# Patient Record
Sex: FEMALE | Race: WHITE | NOT HISPANIC OR LATINO
[De-identification: names, ages, dates, MRNs, and addresses within clinical notes are randomized per-mention and may not be internally consistent; named-entity substitution may affect disease eponyms.]

---

## 2017-04-11 ENCOUNTER — APPOINTMENT (OUTPATIENT)
Dept: NEUROLOGY | Facility: CLINIC | Age: 27
End: 2017-04-11

## 2018-10-13 ENCOUNTER — TRANSCRIPTION ENCOUNTER (OUTPATIENT)
Age: 28
End: 2018-10-13

## 2019-08-28 ENCOUNTER — APPOINTMENT (OUTPATIENT)
Dept: ANTEPARTUM | Facility: CLINIC | Age: 29
End: 2019-08-28

## 2019-08-28 ENCOUNTER — ASOB RESULT (OUTPATIENT)
Age: 29
End: 2019-08-28

## 2019-08-28 ENCOUNTER — APPOINTMENT (OUTPATIENT)
Dept: ANTEPARTUM | Facility: CLINIC | Age: 29
End: 2019-08-28
Payer: COMMERCIAL

## 2019-08-28 PROCEDURE — 76813 OB US NUCHAL MEAS 1 GEST: CPT

## 2019-08-28 PROCEDURE — 76801 OB US < 14 WKS SINGLE FETUS: CPT

## 2019-08-28 PROCEDURE — 36416 COLLJ CAPILLARY BLOOD SPEC: CPT

## 2019-08-31 ENCOUNTER — APPOINTMENT (OUTPATIENT)
Dept: ANTEPARTUM | Facility: CLINIC | Age: 29
End: 2019-08-31

## 2019-10-22 ENCOUNTER — ASOB RESULT (OUTPATIENT)
Age: 29
End: 2019-10-22

## 2019-10-22 ENCOUNTER — APPOINTMENT (OUTPATIENT)
Dept: ANTEPARTUM | Facility: CLINIC | Age: 29
End: 2019-10-22
Payer: COMMERCIAL

## 2019-10-22 PROCEDURE — 76811 OB US DETAILED SNGL FETUS: CPT

## 2019-11-12 ENCOUNTER — OUTPATIENT (OUTPATIENT)
Dept: OUTPATIENT SERVICES | Age: 29
LOS: 1 days | Discharge: HOME | End: 2019-11-12

## 2019-11-13 ENCOUNTER — APPOINTMENT (OUTPATIENT)
Dept: PEDIATRIC CARDIOLOGY | Facility: CLINIC | Age: 29
End: 2019-11-13
Payer: COMMERCIAL

## 2019-11-13 PROCEDURE — 99201 OFFICE OUTPATIENT NEW 10 MINUTES: CPT | Mod: 25

## 2019-11-13 PROCEDURE — 76820 UMBILICAL ARTERY ECHO: CPT

## 2019-11-13 PROCEDURE — 76827 ECHO EXAM OF FETAL HEART: CPT

## 2019-11-13 PROCEDURE — 76821 MIDDLE CEREBRAL ARTERY ECHO: CPT

## 2019-11-13 PROCEDURE — 76825 ECHO EXAM OF FETAL HEART: CPT

## 2019-11-13 PROCEDURE — 93325 DOPPLER ECHO COLOR FLOW MAPG: CPT | Mod: 59

## 2020-01-29 ENCOUNTER — OUTPATIENT (OUTPATIENT)
Dept: OUTPATIENT SERVICES | Facility: HOSPITAL | Age: 30
LOS: 1 days | Discharge: HOME | End: 2020-01-29
Payer: COMMERCIAL

## 2020-01-29 VITALS
DIASTOLIC BLOOD PRESSURE: 56 MMHG | HEART RATE: 65 BPM | SYSTOLIC BLOOD PRESSURE: 104 MMHG | TEMPERATURE: 98 F | RESPIRATION RATE: 20 BRPM

## 2020-01-29 VITALS — TEMPERATURE: 98 F

## 2020-01-29 DIAGNOSIS — O26.899 OTHER SPECIFIED PREGNANCY RELATED CONDITIONS, UNSPECIFIED TRIMESTER: ICD-10-CM

## 2020-01-29 DIAGNOSIS — Z3A.00 WEEKS OF GESTATION OF PREGNANCY NOT SPECIFIED: ICD-10-CM

## 2020-01-29 DIAGNOSIS — Z98.82 BREAST IMPLANT STATUS: Chronic | ICD-10-CM

## 2020-01-29 LAB
ALBUMIN SERPL ELPH-MCNC: 3.3 G/DL — SIGNIFICANT CHANGE UP (ref 3.3–5)
ALP SERPL-CCNC: 93 U/L — SIGNIFICANT CHANGE UP (ref 40–120)
ALT FLD-CCNC: 15 U/L — SIGNIFICANT CHANGE UP (ref 4–33)
AMYLASE P1 CFR SERPL: 80 U/L — SIGNIFICANT CHANGE UP (ref 25–125)
ANION GAP SERPL CALC-SCNC: 11 MMO/L — SIGNIFICANT CHANGE UP (ref 7–14)
APPEARANCE UR: CLEAR — SIGNIFICANT CHANGE UP
AST SERPL-CCNC: 21 U/L — SIGNIFICANT CHANGE UP (ref 4–32)
BASOPHILS # BLD AUTO: 0.05 K/UL — SIGNIFICANT CHANGE UP (ref 0–0.2)
BASOPHILS NFR BLD AUTO: 0.4 % — SIGNIFICANT CHANGE UP (ref 0–2)
BILIRUB SERPL-MCNC: 0.4 MG/DL — SIGNIFICANT CHANGE UP (ref 0.2–1.2)
BILIRUB UR-MCNC: NEGATIVE — SIGNIFICANT CHANGE UP
BLOOD UR QL VISUAL: NEGATIVE — SIGNIFICANT CHANGE UP
BUN SERPL-MCNC: 8 MG/DL — SIGNIFICANT CHANGE UP (ref 7–23)
CALCIUM SERPL-MCNC: 9 MG/DL — SIGNIFICANT CHANGE UP (ref 8.4–10.5)
CHLORIDE SERPL-SCNC: 103 MMOL/L — SIGNIFICANT CHANGE UP (ref 98–107)
CO2 SERPL-SCNC: 23 MMOL/L — SIGNIFICANT CHANGE UP (ref 22–31)
COLOR SPEC: YELLOW — SIGNIFICANT CHANGE UP
CREAT SERPL-MCNC: 0.5 MG/DL — SIGNIFICANT CHANGE UP (ref 0.5–1.3)
EOSINOPHIL # BLD AUTO: 0.09 K/UL — SIGNIFICANT CHANGE UP (ref 0–0.5)
EOSINOPHIL NFR BLD AUTO: 0.8 % — SIGNIFICANT CHANGE UP (ref 0–6)
GLUCOSE SERPL-MCNC: 64 MG/DL — LOW (ref 70–99)
GLUCOSE UR-MCNC: NEGATIVE — SIGNIFICANT CHANGE UP
HCT VFR BLD CALC: 37.2 % — SIGNIFICANT CHANGE UP (ref 34.5–45)
HGB BLD-MCNC: 12.5 G/DL — SIGNIFICANT CHANGE UP (ref 11.5–15.5)
IMM GRANULOCYTES NFR BLD AUTO: 0.5 % — SIGNIFICANT CHANGE UP (ref 0–1.5)
KETONES UR-MCNC: NEGATIVE — SIGNIFICANT CHANGE UP
LEUKOCYTE ESTERASE UR-ACNC: NEGATIVE — SIGNIFICANT CHANGE UP
LIDOCAIN IGE QN: 24.6 U/L — SIGNIFICANT CHANGE UP (ref 7–60)
LYMPHOCYTES # BLD AUTO: 2.68 K/UL — SIGNIFICANT CHANGE UP (ref 1–3.3)
LYMPHOCYTES # BLD AUTO: 22.6 % — SIGNIFICANT CHANGE UP (ref 13–44)
MCHC RBC-ENTMCNC: 30.6 PG — SIGNIFICANT CHANGE UP (ref 27–34)
MCHC RBC-ENTMCNC: 33.6 % — SIGNIFICANT CHANGE UP (ref 32–36)
MCV RBC AUTO: 91.2 FL — SIGNIFICANT CHANGE UP (ref 80–100)
MONOCYTES # BLD AUTO: 0.9 K/UL — SIGNIFICANT CHANGE UP (ref 0–0.9)
MONOCYTES NFR BLD AUTO: 7.6 % — SIGNIFICANT CHANGE UP (ref 2–14)
NEUTROPHILS # BLD AUTO: 8.07 K/UL — HIGH (ref 1.8–7.4)
NEUTROPHILS NFR BLD AUTO: 68.1 % — SIGNIFICANT CHANGE UP (ref 43–77)
NITRITE UR-MCNC: NEGATIVE — SIGNIFICANT CHANGE UP
NRBC # FLD: 0 K/UL — SIGNIFICANT CHANGE UP (ref 0–0)
PH UR: 7 — SIGNIFICANT CHANGE UP (ref 5–8)
PLATELET # BLD AUTO: 184 K/UL — SIGNIFICANT CHANGE UP (ref 150–400)
PMV BLD: 10.5 FL — SIGNIFICANT CHANGE UP (ref 7–13)
POTASSIUM SERPL-MCNC: 4.2 MMOL/L — SIGNIFICANT CHANGE UP (ref 3.5–5.3)
POTASSIUM SERPL-SCNC: 4.2 MMOL/L — SIGNIFICANT CHANGE UP (ref 3.5–5.3)
PROT SERPL-MCNC: 6.1 G/DL — SIGNIFICANT CHANGE UP (ref 6–8.3)
PROT UR-MCNC: 20 — SIGNIFICANT CHANGE UP
RBC # BLD: 4.08 M/UL — SIGNIFICANT CHANGE UP (ref 3.8–5.2)
RBC # FLD: 13.1 % — SIGNIFICANT CHANGE UP (ref 10.3–14.5)
SODIUM SERPL-SCNC: 137 MMOL/L — SIGNIFICANT CHANGE UP (ref 135–145)
SP GR SPEC: 1.02 — SIGNIFICANT CHANGE UP (ref 1–1.04)
UROBILINOGEN FLD QL: NORMAL — SIGNIFICANT CHANGE UP
WBC # BLD: 11.85 K/UL — HIGH (ref 3.8–10.5)
WBC # FLD AUTO: 11.85 K/UL — HIGH (ref 3.8–10.5)

## 2020-01-29 PROCEDURE — 99202 OFFICE O/P NEW SF 15 MIN: CPT

## 2020-01-29 PROCEDURE — 59025 FETAL NON-STRESS TEST: CPT | Mod: 26

## 2020-01-29 PROCEDURE — 76818 FETAL BIOPHYS PROFILE W/NST: CPT | Mod: 26

## 2020-01-29 NOTE — OB PROVIDER TRIAGE NOTE - NSHPLABSRESULTS_GEN_ALL_CORE
CBC  CMP  amylase/ lipase  urinalysis CBC  CMP  amylase/ lipase  urinalysis                          12.5   11.85 )-----------( 184      ( 2020 17:45 )             37.2   20 @ 17:45    137  |  103  |  8             --------------------------< 64<L>     4.2  |  23  | 0.50    eGFR AA: 149  eGFR N-AA: 129    Calcium: 9.0  Phosphorus: --  Magnesium: --    AST: 21    ALT: 15  AlkPhos: 93  Protein: 6.1  Albumin: 3.3  TBili: 0.4  D-Bili: --    Urinalysis Basic - ( 2020 17:45 )    Color: YELLOW / Appearance: CLEAR / S.023 / pH: 7.0  Gluc: NEGATIVE / Ketone: NEGATIVE  / Bili: NEGATIVE / Urobili: NORMAL   Blood: NEGATIVE / Protein: 20 / Nitrite: NEGATIVE   Leuk Esterase: NEGATIVE / RBC: x / WBC x   Sq Epi: x / Non Sq Epi: x / Bacteria: x

## 2020-01-29 NOTE — OB PROVIDER TRIAGE NOTE - NSOBPROVIDERNOTE_OBGYN_ALL_OB_FT
30 y/o  @ 34.4 wks gest presents with c/o 2 episodes of diarrhea one on  x's 1 hour and again @ 0200 this am , denies any blood in stool denies eating out at a restaurant denies any n/v denies any recent sick exposure denies any fever or chills denies any lof or VB reports +FM pt c/o intermittent abdominal cramping pt tolerating po solids and fluids ap care comp by myomas x's 2 / IVF pregnancy - unexplained infertility otherwise no other ap care comp at this time      abdomen: soft, nt on palp  SVE: closed /50/-3  T(C): 36.7 (20 @ 14:06), Max: 36.7 (20 @ 14:06)  HR: 63 (20 @ 17:29) (60 - 65)  BP: 97/55 (20 @ 17:29) (97/55 - 113/81)  RR: 20 (20 @ 14:06) (20 - 20)  SpO2: --  cat 1 FHT  toco: x's 2 uterine ctxns     denies any c/o diarrhea or abdominal cramping at this time    po hydration   po solids challenge    TAS: BPP: 8 vtx anterior placenta GERA: 10.38     allergies:  PCN- anaphylaxis  med hx: denies  surg hx:   age 20 breast augmentation   gyn hx:   IVF- unexplained infertility  myomas x's 2   ob hx: denies  medS:   PNV    awaiting labs  will continue to monitor 28 y/o  @ 34.4 wks gest presents with c/o 2 episodes of diarrhea one on  x's 1 hour and again @ 0200 this am , denies any blood in stool denies eating out at a restaurant denies any n/v denies any recent sick exposure denies any fever or chills denies any lof or VB reports +FM pt c/o intermittent abdominal cramping pt tolerating po solids and fluids ap care comp by myomas x's 2 / IVF pregnancy - unexplained infertility otherwise no other ap care comp at this time      abdomen: soft, nt on palp  SVE: closed /50/-3  T(C): 36.7 (20 @ 14:06), Max: 36.7 (20 @ 14:06)  HR: 63 (20 @ 17:29) (60 - 65)  BP: 97/55 (20 @ 17:29) (97/55 - 113/81)  RR: 20 (20 @ 14:06) (20 - 20)  SpO2: --  cat 1 FHT  toco: x's 2 uterine ctxns     denies any c/o diarrhea or abdominal cramping at this time    po hydration   po solids challenge    TAS: BPP: 8/8 vtx anterior placenta GERA: 10.38     allergies:  PCN- anaphylaxis  med hx: denies  surg hx:   age 20 breast augmentation   gyn hx:   IVF- unexplained infertility  myomas x's 2   ob hx: denies  medS:   PNV    awaiting labs  will continue to monitor      19:15- Received care of patient  Awaiting labs  Patient able to tolerate crackers and oral hydration at this time

## 2020-01-29 NOTE — OB PROVIDER TRIAGE NOTE - ADDITIONAL INSTRUCTIONS
Follow up at next scheduled prenatal appointment  Return for decreased fetal movement, loss of fluid or vaginal bleeding  Increase oral hydration  Increase diet as tolerated

## 2020-01-29 NOTE — OB RN TRIAGE NOTE - CHIEF COMPLAINT QUOTE
diarrhea started at 4am on sunday morning, nothing  on monday, 2 bouts of diarrhea just with abdominal cramping now diarrhea started at 4am on sunday morning, nothing  on monday, 2 bouts of diarrhea @ 0200 , abdominal cramping now

## 2020-02-04 ENCOUNTER — LABORATORY RESULT (OUTPATIENT)
Age: 30
End: 2020-02-04

## 2020-02-04 ENCOUNTER — APPOINTMENT (OUTPATIENT)
Dept: ENDOCRINOLOGY | Facility: CLINIC | Age: 30
End: 2020-02-04
Payer: COMMERCIAL

## 2020-02-04 VITALS
BODY MASS INDEX: 28.35 KG/M2 | SYSTOLIC BLOOD PRESSURE: 118 MMHG | DIASTOLIC BLOOD PRESSURE: 68 MMHG | HEART RATE: 76 BPM | WEIGHT: 160 LBS | OXYGEN SATURATION: 98 % | HEIGHT: 63 IN

## 2020-02-04 DIAGNOSIS — Z83.49 FAMILY HISTORY OF OTHER ENDOCRINE, NUTRITIONAL AND METABOLIC DISEASES: ICD-10-CM

## 2020-02-04 DIAGNOSIS — Z78.9 OTHER SPECIFIED HEALTH STATUS: ICD-10-CM

## 2020-02-04 DIAGNOSIS — N97.9 FEMALE INFERTILITY, UNSPECIFIED: ICD-10-CM

## 2020-02-04 PROCEDURE — 99205 OFFICE O/P NEW HI 60 MIN: CPT

## 2020-02-04 RX ORDER — ELASTIC BANDAGE 2"X2.2YD
BANDAGE TOPICAL
Refills: 0 | Status: ACTIVE | COMMUNITY

## 2020-02-04 NOTE — ASSESSMENT
[Levothyroxine] : The patient was instructed to take Levothyroxine on an empty stomach, separate from vitamins, and wait at least 30 minutes before eating [FreeTextEntry1] : 29 year old woman with hypothyroidism in setting of infertility, currently at 35 weeks gestation\par \par 1. Hypothyroidism in setting of infertility: Currently on Levothyroxine 50 mcg daily. Appears clinically euthyroid today. Will check TFTs today (TSH, free T4, total T3 and T3 uptake) to assess if she is biochemically euthyroid and will adjust dose as needed. Goal TSH in 3rd trimester of pregnancy is < 3. Will also check TPO titer as well. She was started on LT4 for TSH optimization during pregnancy given history of infertility requiring IVF. Infertility work up reportedly negative in the past. We discussed that she will mostly likely not require LT4 therapy after pregnancy given her TSH levels in the first trimester. Will have her follow up in 8 weeks (approximately 1 month after delivery) and will repeat TFTs then prior to discontinuation of Levothyroxine. She is agreeable to plan.\par \par Return visit in 8 weeks

## 2020-02-04 NOTE — PHYSICAL EXAM
[Alert] : alert [Well Nourished] : well nourished [No Acute Distress] : no acute distress [Well Developed] : well developed [Normal Sclera/Conjunctiva] : normal sclera/conjunctiva [Supple] : the neck was supple [No Neck Mass] : no neck mass was observed [No Proptosis] : no proptosis [No Respiratory Distress] : no respiratory distress [Thyroid Not Enlarged] : the thyroid was not enlarged [Normal Rate and Effort] : normal respiratory rhythm and effort [No Accessory Muscle Use] : no accessory muscle use [Normal S1, S2] : normal S1 and S2 [Clear to Auscultation] : lungs were clear to auscultation bilaterally [Normal Rate] : heart rate was normal  [No Edema] : there was no peripheral edema [Regular Rhythm] : with a regular rhythm [Normal Bowel Sounds] : normal bowel sounds [Not Tender] : non-tender [Soft] : abdomen soft [Normal Gait] : normal gait [Spine Straight] : spine straight [No Involuntary Movements] : no involuntary movements were seen [No Clubbing, Cyanosis] : no clubbing  or cyanosis of the fingernails [No Motor Deficits] : the motor exam was normal [No Tremors] : no tremors [Normal Insight/Judgement] : insight and judgment were intact [Oriented x3] : oriented to person, place, and time [Normal Affect] : the affect was normal [Normal Mood] : the mood was normal [Kyphosis] : no kyphosis present [Acne] : no acne [Hirsutism] : no hirsutism [Acanthosis Nigricans] : no acanthosis nigricans [de-identified] : +gravid abdomen

## 2020-02-04 NOTE — HISTORY OF PRESENT ILLNESS
[FreeTextEntry1] : chief complaint: hypothyroidism\par \par Patient is a 29 year old woman with hypothyroidism and history of infertility here for evaluation of hypothyroidism. She states that she and her  were trying to conceive for 1 year and 4 months and she was not able to become pregnant. She saw reproductive endocrinology, and no abnormality was noted. However, she decided to proceed with IVF. She was found to have of TSH 1.39 and 2.53 in 7/2019. She was started on Levothyroxine 25 mcg daily in the summer of 2019 when she was 5-6 weeks pregnant, and dose was increased to 50 mcg daily. Taking it daily. She is currently at 35 weeks gestation, due date is in early March 2020. Does not have neck pain, dysphagia, dysphonia. No chest pain, palpitations, abdominal pain, nausea, vomiting. She has had chronic constipation throughout the pregnancy. Did have a day or so of diarrhea last week that resolved. No skin or hair changes. Has been gaining weight appropriately during the pregnancy. Has some fatigue now but has been neverthless going to work and to the gym. No history of surgery/RT to the neck.

## 2020-02-04 NOTE — REVIEW OF SYSTEMS
[Recent Weight Gain (___ Lbs)] : recent [unfilled] ~Ulb weight gain [Fatigue] : fatigue [Constipation] : constipation [All other systems negative] : All other systems negative [Recent Weight Loss (___ Lbs)] : no recent weight loss [Fever] : no fever [Chills] : no chills [Dysphagia] : no dysphagia [Neck Pain] : no neck pain [Dysphonia] : no dysphonia [Chest Pain] : no chest pain [Palpitations] : no palpitations [Shortness Of Breath] : no shortness of breath [Lower Ext Edema] : no lower extremity edema [Cough] : no cough [Nausea] : no nausea [Vomiting] : no vomiting was observed [Diarrhea] : no diarrhea [Hair Loss] : no hair loss [Abdominal Pain] : no abdominal pain [Dry Skin] : no dry skin [Tremors] : no tremors [Headache] : no headaches [Depression] : no depression [Anxiety] : no anxiety [Cold Intolerance] : cold tolerant [Heat Intolerance] : heat tolerant

## 2020-02-05 ENCOUNTER — APPOINTMENT (OUTPATIENT)
Dept: FAMILY MEDICINE | Facility: CLINIC | Age: 30
End: 2020-02-05

## 2020-02-05 LAB
T3RU NFR SERPL: 1.3 TBI
T4 FREE SERPL-MCNC: 1.2 NG/DL
T4 SERPL-MCNC: 11.8 UG/DL
THYROPEROXIDASE AB SERPL IA-ACNC: 35 IU/ML
TSH SERPL-ACNC: 0.71 UIU/ML

## 2020-02-11 ENCOUNTER — APPOINTMENT (OUTPATIENT)
Dept: FAMILY MEDICINE | Facility: CLINIC | Age: 30
End: 2020-02-11

## 2020-02-12 ENCOUNTER — APPOINTMENT (OUTPATIENT)
Dept: FAMILY MEDICINE | Facility: CLINIC | Age: 30
End: 2020-02-12

## 2020-02-19 ENCOUNTER — OUTPATIENT (OUTPATIENT)
Dept: INPATIENT UNIT | Facility: HOSPITAL | Age: 30
LOS: 1 days | Discharge: HOME | End: 2020-02-19
Payer: COMMERCIAL

## 2020-02-19 VITALS
RESPIRATION RATE: 16 BRPM | DIASTOLIC BLOOD PRESSURE: 68 MMHG | SYSTOLIC BLOOD PRESSURE: 104 MMHG | TEMPERATURE: 98 F | HEART RATE: 76 BPM

## 2020-02-19 VITALS — DIASTOLIC BLOOD PRESSURE: 59 MMHG | HEART RATE: 71 BPM | SYSTOLIC BLOOD PRESSURE: 96 MMHG

## 2020-02-19 DIAGNOSIS — Z3A.00 WEEKS OF GESTATION OF PREGNANCY NOT SPECIFIED: ICD-10-CM

## 2020-02-19 DIAGNOSIS — O26.899 OTHER SPECIFIED PREGNANCY RELATED CONDITIONS, UNSPECIFIED TRIMESTER: ICD-10-CM

## 2020-02-19 DIAGNOSIS — Z98.82 BREAST IMPLANT STATUS: Chronic | ICD-10-CM

## 2020-02-19 PROCEDURE — 76818 FETAL BIOPHYS PROFILE W/NST: CPT | Mod: 26

## 2020-02-19 PROCEDURE — 99213 OFFICE O/P EST LOW 20 MIN: CPT

## 2020-02-19 NOTE — OB RN TRIAGE NOTE - PMH
Abnormal cervical Papanicolaou smear, unspecified abnormal pap finding  low pap A  Fibroids    Hypothyroid  rx with levothyroxine

## 2020-02-19 NOTE — OB RN TRIAGE NOTE - CHIEF COMPLAINT QUOTE
srom 1120, clear. less FM than usual:  in [place] srom 1120, clear. less FM than usual:  in [place].  Took Tylenol ES @ 10pm for h/a.

## 2020-02-19 NOTE — OB PROVIDER TRIAGE NOTE - NSOBPROVIDERNOTE_OBGYN_ALL_OB_FT
's patient is a 31 y/o EGA 37  reports of possible leaking of fluid at 1120. Patient reports of fetal movement. Denies cramping, contractions.     AP complications: Lol papa A, currently taking ASA, 2 times a day  Medical History: Hypothyroidism, Levothyroxine 50 mcg  Surgical History: Breast Augmentation at 18 y/o  OBGYN History: Abnormal PAP, fibroids    Vital Signs Last 24 Hrs  T(C): 36.7 (2020 13:20), Max: 36.7 (2020 13:20)  T(F): 98.1 (2020 13:20), Max: 98.1 (2020 13:20)  HR: 76 (2020 13:44) (76 - 76)  BP: 104/68 (2020 13:44) (104/68 - 104/68)  RR: 16 (2020 13:20) (16 - 16)  TAS:  FHR: 120 baseline with accelerations, no decelerations, moderate variability  CTX: irregular contractions  SSE: no fluid present in vault, leukorrhea present, nitrazine negative, fern negative 's patient is a 31 y/o EGA 37  reports of possible leaking of fluid at 1120. Patient reports of fetal movement. Denies cramping, contractions.     AP complications: Lol papa A, currently taking ASA, 2 times a day  Medical History: Hypothyroidism, Levothyroxine 50 mcg  Surgical History: Breast Augmentation at 20 y/o  OBGYN History: Abnormal PAP, fibroids    Vital Signs Last 24 Hrs  T(C): 36.7 (2020 13:20), Max: 36.7 (2020 13:20)  T(F): 98.1 (2020 13:20), Max: 98.1 (2020 13:20)  HR: 76 (2020 13:44) (76 - 76)  BP: 104/68 (2020 13:44) (104/68 - 104/68)  RR: 16 (2020 13:20) (16 - 16)  TAS: anterior placenta, cephalic presentation, 8/8 BPP, GERA 12  FHR: 120 baseline with accelerations, no decelerations, moderate variability  CTX: irregular contractions  SSE: no fluid present in vault, leukorrhea present, nitrazine negative, fern negative    No evidence rupture of membrane.  - cleared for discharge to home, discussed with   - patient to follow up as scheduled next week  - patient educated on returning for signs of labor and/or concern of leaking of fluid

## 2020-02-19 NOTE — OB PROVIDER TRIAGE NOTE - HISTORY OF PRESENT ILLNESS
's patient is a 31 y/o EGA 37  reports of possible leaking of fluid at 1120. Patient reports of fetal movement. Denies cramping, contractions.     AP complications: Lol papa A, currently taking ASA, 2 times a day  Medical History: Hypothyroidism, Levothyroxine 50 mcg  Surgical History: Breast Augmentation at 20 y/o  OBGYN History: Abnormal PAP, fibroids

## 2020-02-19 NOTE — OB PROVIDER TRIAGE NOTE - NS_FETALMOVEMENT_OBGYN_ALL_OB
Keep the cast/splint/dressing clean and dry./Verbal/written post procedure instructions were given to patient/caregiver./Instructed patient/caregiver to follow-up with primary care physician./Instructed patient/caregiver regarding signs and symptoms of infection.
Present, unchanged

## 2020-02-19 NOTE — OB PROVIDER TRIAGE NOTE - ADDITIONAL INSTRUCTIONS
No evidence rupture of membrane.  - cleared for discharge to home, discussed with   - patient to follow up as scheduled next week  - patient educated on returning for signs of labor and/or concern of leaking of fluid

## 2020-02-19 NOTE — OB PROVIDER TRIAGE NOTE - NSHPPHYSICALEXAM_GEN_ALL_CORE
Vital Signs Last 24 Hrs  T(C): 36.7 (19 Feb 2020 13:20), Max: 36.7 (19 Feb 2020 13:20)  T(F): 98.1 (19 Feb 2020 13:20), Max: 98.1 (19 Feb 2020 13:20)  HR: 76 (19 Feb 2020 13:44) (76 - 76)  BP: 104/68 (19 Feb 2020 13:44) (104/68 - 104/68)  RR: 16 (19 Feb 2020 13:20) (16 - 16)  TAS:  FHR: 120 baseline with accelerations, no decelerations, moderate variability  CTX: irregular contractions  SSE: no fluid present in vault, leukorrhea present, nitrazine negative, fern negative Vital Signs Last 24 Hrs  T(C): 36.7 (19 Feb 2020 13:20), Max: 36.7 (19 Feb 2020 13:20)  T(F): 98.1 (19 Feb 2020 13:20), Max: 98.1 (19 Feb 2020 13:20)  HR: 76 (19 Feb 2020 13:44) (76 - 76)  BP: 104/68 (19 Feb 2020 13:44) (104/68 - 104/68)  RR: 16 (19 Feb 2020 13:20) (16 - 16)  TAS: anterior placenta, cephalic presentation, 8/8 BPP, GERA 12  FHR: 120 baseline with accelerations, no decelerations, moderate variability  CTX: irregular contractions  SSE: no fluid present in vault, leukorrhea present, nitrazine negative, fern negative

## 2020-03-17 PROBLEM — E03.9 HYPOTHYROIDISM, UNSPECIFIED: Chronic | Status: ACTIVE | Noted: 2020-02-19

## 2020-03-18 ENCOUNTER — APPOINTMENT (OUTPATIENT)
Dept: ANTEPARTUM | Facility: CLINIC | Age: 30
End: 2020-03-18

## 2020-03-18 ENCOUNTER — APPOINTMENT (OUTPATIENT)
Dept: ANTEPARTUM | Facility: HOSPITAL | Age: 30
End: 2020-03-18

## 2020-03-18 ENCOUNTER — INPATIENT (INPATIENT)
Facility: HOSPITAL | Age: 30
LOS: 1 days | Discharge: HOME | End: 2020-03-20
Attending: OBSTETRICS & GYNECOLOGY | Admitting: OBSTETRICS & GYNECOLOGY
Payer: COMMERCIAL

## 2020-03-18 ENCOUNTER — TRANSCRIPTION ENCOUNTER (OUTPATIENT)
Age: 30
End: 2020-03-18

## 2020-03-18 VITALS
SYSTOLIC BLOOD PRESSURE: 108 MMHG | RESPIRATION RATE: 18 BRPM | DIASTOLIC BLOOD PRESSURE: 71 MMHG | HEART RATE: 68 BPM | TEMPERATURE: 98 F

## 2020-03-18 DIAGNOSIS — Z98.82 BREAST IMPLANT STATUS: Chronic | ICD-10-CM

## 2020-03-18 DIAGNOSIS — O26.899 OTHER SPECIFIED PREGNANCY RELATED CONDITIONS, UNSPECIFIED TRIMESTER: ICD-10-CM

## 2020-03-18 DIAGNOSIS — Z3A.00 WEEKS OF GESTATION OF PREGNANCY NOT SPECIFIED: ICD-10-CM

## 2020-03-18 DIAGNOSIS — Z34.90 ENCOUNTER FOR SUPERVISION OF NORMAL PREGNANCY, UNSPECIFIED, UNSPECIFIED TRIMESTER: ICD-10-CM

## 2020-03-18 LAB
ANTIBODY ID 1_1: SIGNIFICANT CHANGE UP
BASOPHILS # BLD AUTO: 0.03 K/UL — SIGNIFICANT CHANGE UP (ref 0–0.2)
BASOPHILS NFR BLD AUTO: 0.3 % — SIGNIFICANT CHANGE UP (ref 0–2)
BLD GP AB SCN SERPL QL: POSITIVE — SIGNIFICANT CHANGE UP
DAT C3-SP REAG RBC QL: NEGATIVE — SIGNIFICANT CHANGE UP
DAT POLY-SP REAG RBC QL: NEGATIVE — SIGNIFICANT CHANGE UP
DIRECT COOMBS IGG: NEGATIVE — SIGNIFICANT CHANGE UP
EOSINOPHIL # BLD AUTO: 0.11 K/UL — SIGNIFICANT CHANGE UP (ref 0–0.5)
EOSINOPHIL NFR BLD AUTO: 0.9 % — SIGNIFICANT CHANGE UP (ref 0–6)
HCT VFR BLD CALC: 34.4 % — LOW (ref 34.5–45)
HCT VFR BLD CALC: 38.7 % — SIGNIFICANT CHANGE UP (ref 34.5–45)
HGB BLD-MCNC: 11.7 G/DL — SIGNIFICANT CHANGE UP (ref 11.5–15.5)
HGB BLD-MCNC: 13.5 G/DL — SIGNIFICANT CHANGE UP (ref 11.5–15.5)
IMM GRANULOCYTES NFR BLD AUTO: 0.4 % — SIGNIFICANT CHANGE UP (ref 0–1.5)
LYMPHOCYTES # BLD AUTO: 18.4 % — SIGNIFICANT CHANGE UP (ref 13–44)
LYMPHOCYTES # BLD AUTO: 2.19 K/UL — SIGNIFICANT CHANGE UP (ref 1–3.3)
MCHC RBC-ENTMCNC: 30.3 PG — SIGNIFICANT CHANGE UP (ref 27–34)
MCHC RBC-ENTMCNC: 30.8 PG — SIGNIFICANT CHANGE UP (ref 27–34)
MCHC RBC-ENTMCNC: 34 % — SIGNIFICANT CHANGE UP (ref 32–36)
MCHC RBC-ENTMCNC: 34.9 % — SIGNIFICANT CHANGE UP (ref 32–36)
MCV RBC AUTO: 88.4 FL — SIGNIFICANT CHANGE UP (ref 80–100)
MCV RBC AUTO: 89.1 FL — SIGNIFICANT CHANGE UP (ref 80–100)
MONOCYTES # BLD AUTO: 1.03 K/UL — HIGH (ref 0–0.9)
MONOCYTES NFR BLD AUTO: 8.7 % — SIGNIFICANT CHANGE UP (ref 2–14)
NEUTROPHILS # BLD AUTO: 8.48 K/UL — HIGH (ref 1.8–7.4)
NEUTROPHILS NFR BLD AUTO: 71.3 % — SIGNIFICANT CHANGE UP (ref 43–77)
NRBC # FLD: 0 K/UL — SIGNIFICANT CHANGE UP (ref 0–0)
NRBC # FLD: 0 K/UL — SIGNIFICANT CHANGE UP (ref 0–0)
PLATELET # BLD AUTO: 193 K/UL — SIGNIFICANT CHANGE UP (ref 150–400)
PLATELET # BLD AUTO: 217 K/UL — SIGNIFICANT CHANGE UP (ref 150–400)
PMV BLD: 10.4 FL — SIGNIFICANT CHANGE UP (ref 7–13)
PMV BLD: 10.5 FL — SIGNIFICANT CHANGE UP (ref 7–13)
RBC # BLD: 3.86 M/UL — SIGNIFICANT CHANGE UP (ref 3.8–5.2)
RBC # BLD: 4.38 M/UL — SIGNIFICANT CHANGE UP (ref 3.8–5.2)
RBC # FLD: 12.6 % — SIGNIFICANT CHANGE UP (ref 10.3–14.5)
RBC # FLD: 12.8 % — SIGNIFICANT CHANGE UP (ref 10.3–14.5)
RH IG SCN BLD-IMP: NEGATIVE — SIGNIFICANT CHANGE UP
RH IG SCN BLD-IMP: NEGATIVE — SIGNIFICANT CHANGE UP
T PALLIDUM AB TITR SER: NEGATIVE — SIGNIFICANT CHANGE UP
WBC # BLD: 11.89 K/UL — HIGH (ref 3.8–10.5)
WBC # BLD: 17.79 K/UL — HIGH (ref 3.8–10.5)
WBC # FLD AUTO: 11.89 K/UL — HIGH (ref 3.8–10.5)
WBC # FLD AUTO: 17.79 K/UL — HIGH (ref 3.8–10.5)

## 2020-03-18 PROCEDURE — 86077 PHYS BLOOD BANK SERV XMATCH: CPT

## 2020-03-18 RX ORDER — PRAMOXINE HYDROCHLORIDE 150 MG/15G
1 AEROSOL, FOAM RECTAL EVERY 4 HOURS
Refills: 0 | Status: DISCONTINUED | OUTPATIENT
Start: 2020-03-18 | End: 2020-03-20

## 2020-03-18 RX ORDER — IBUPROFEN 200 MG
600 TABLET ORAL EVERY 6 HOURS
Refills: 0 | Status: COMPLETED | OUTPATIENT
Start: 2020-03-18 | End: 2021-02-14

## 2020-03-18 RX ORDER — OXYTOCIN 10 UNIT/ML
2 VIAL (ML) INJECTION
Qty: 30 | Refills: 0 | Status: DISCONTINUED | OUTPATIENT
Start: 2020-03-18 | End: 2020-03-19

## 2020-03-18 RX ORDER — KETOROLAC TROMETHAMINE 30 MG/ML
30 SYRINGE (ML) INJECTION ONCE
Refills: 0 | Status: DISCONTINUED | OUTPATIENT
Start: 2020-03-18 | End: 2020-03-18

## 2020-03-18 RX ORDER — GENTAMICIN SULFATE 40 MG/ML
390 VIAL (ML) INJECTION ONCE
Refills: 0 | Status: DISCONTINUED | OUTPATIENT
Start: 2020-03-18 | End: 2020-03-18

## 2020-03-18 RX ORDER — SODIUM CHLORIDE 9 MG/ML
3 INJECTION INTRAMUSCULAR; INTRAVENOUS; SUBCUTANEOUS EVERY 8 HOURS
Refills: 0 | Status: DISCONTINUED | OUTPATIENT
Start: 2020-03-18 | End: 2020-03-20

## 2020-03-18 RX ORDER — SIMETHICONE 80 MG/1
80 TABLET, CHEWABLE ORAL EVERY 4 HOURS
Refills: 0 | Status: DISCONTINUED | OUTPATIENT
Start: 2020-03-18 | End: 2020-03-20

## 2020-03-18 RX ORDER — OXYCODONE HYDROCHLORIDE 5 MG/1
5 TABLET ORAL
Refills: 0 | Status: DISCONTINUED | OUTPATIENT
Start: 2020-03-18 | End: 2020-03-20

## 2020-03-18 RX ORDER — AER TRAVELER 0.5 G/1
1 SOLUTION RECTAL; TOPICAL EVERY 4 HOURS
Refills: 0 | Status: DISCONTINUED | OUTPATIENT
Start: 2020-03-18 | End: 2020-03-20

## 2020-03-18 RX ORDER — SODIUM CHLORIDE 9 MG/ML
1000 INJECTION, SOLUTION INTRAVENOUS
Refills: 0 | Status: DISCONTINUED | OUTPATIENT
Start: 2020-03-18 | End: 2020-03-18

## 2020-03-18 RX ORDER — DIPHENHYDRAMINE HCL 50 MG
25 CAPSULE ORAL EVERY 6 HOURS
Refills: 0 | Status: DISCONTINUED | OUTPATIENT
Start: 2020-03-18 | End: 2020-03-20

## 2020-03-18 RX ORDER — LANOLIN
1 OINTMENT (GRAM) TOPICAL EVERY 6 HOURS
Refills: 0 | Status: DISCONTINUED | OUTPATIENT
Start: 2020-03-18 | End: 2020-03-20

## 2020-03-18 RX ORDER — SODIUM CHLORIDE 9 MG/ML
1000 INJECTION, SOLUTION INTRAVENOUS ONCE
Refills: 0 | Status: COMPLETED | OUTPATIENT
Start: 2020-03-18 | End: 2020-03-18

## 2020-03-18 RX ORDER — TETANUS TOXOID, REDUCED DIPHTHERIA TOXOID AND ACELLULAR PERTUSSIS VACCINE, ADSORBED 5; 2.5; 8; 8; 2.5 [IU]/.5ML; [IU]/.5ML; UG/.5ML; UG/.5ML; UG/.5ML
0.5 SUSPENSION INTRAMUSCULAR ONCE
Refills: 0 | Status: DISCONTINUED | OUTPATIENT
Start: 2020-03-18 | End: 2020-03-20

## 2020-03-18 RX ORDER — OXYTOCIN 10 UNIT/ML
333.33 VIAL (ML) INJECTION
Qty: 20 | Refills: 0 | Status: COMPLETED | OUTPATIENT
Start: 2020-03-18 | End: 2020-03-18

## 2020-03-18 RX ORDER — OXYCODONE HYDROCHLORIDE 5 MG/1
5 TABLET ORAL ONCE
Refills: 0 | Status: DISCONTINUED | OUTPATIENT
Start: 2020-03-18 | End: 2020-03-20

## 2020-03-18 RX ORDER — BENZOCAINE 10 %
1 GEL (GRAM) MUCOUS MEMBRANE EVERY 6 HOURS
Refills: 0 | Status: DISCONTINUED | OUTPATIENT
Start: 2020-03-18 | End: 2020-03-20

## 2020-03-18 RX ORDER — ERTAPENEM SODIUM 1 G/1
1000 INJECTION, POWDER, LYOPHILIZED, FOR SOLUTION INTRAMUSCULAR; INTRAVENOUS ONCE
Refills: 0 | Status: DISCONTINUED | OUTPATIENT
Start: 2020-03-18 | End: 2020-03-18

## 2020-03-18 RX ORDER — ACETAMINOPHEN 500 MG
975 TABLET ORAL
Refills: 0 | Status: DISCONTINUED | OUTPATIENT
Start: 2020-03-18 | End: 2020-03-20

## 2020-03-18 RX ORDER — DIBUCAINE 1 %
1 OINTMENT (GRAM) RECTAL EVERY 6 HOURS
Refills: 0 | Status: DISCONTINUED | OUTPATIENT
Start: 2020-03-18 | End: 2020-03-20

## 2020-03-18 RX ORDER — MAGNESIUM HYDROXIDE 400 MG/1
30 TABLET, CHEWABLE ORAL
Refills: 0 | Status: DISCONTINUED | OUTPATIENT
Start: 2020-03-18 | End: 2020-03-20

## 2020-03-18 RX ORDER — GLYCERIN ADULT
1 SUPPOSITORY, RECTAL RECTAL AT BEDTIME
Refills: 0 | Status: DISCONTINUED | OUTPATIENT
Start: 2020-03-18 | End: 2020-03-20

## 2020-03-18 RX ORDER — LEVOTHYROXINE SODIUM 125 MCG
50 TABLET ORAL DAILY
Refills: 0 | Status: DISCONTINUED | OUTPATIENT
Start: 2020-03-18 | End: 2020-03-20

## 2020-03-18 RX ORDER — HYDROCORTISONE 1 %
1 OINTMENT (GRAM) TOPICAL EVERY 6 HOURS
Refills: 0 | Status: DISCONTINUED | OUTPATIENT
Start: 2020-03-18 | End: 2020-03-20

## 2020-03-18 RX ORDER — GENTAMICIN SULFATE 40 MG/ML
310 VIAL (ML) INJECTION ONCE
Refills: 0 | Status: COMPLETED | OUTPATIENT
Start: 2020-03-18 | End: 2020-03-18

## 2020-03-18 RX ORDER — OXYTOCIN 10 UNIT/ML
333.33 VIAL (ML) INJECTION
Qty: 20 | Refills: 0 | Status: DISCONTINUED | OUTPATIENT
Start: 2020-03-18 | End: 2020-03-19

## 2020-03-18 RX ADMIN — Medication 1000 MILLIUNIT(S)/MIN: at 23:54

## 2020-03-18 RX ADMIN — Medication 500 MILLIGRAM(S): at 23:54

## 2020-03-18 RX ADMIN — SODIUM CHLORIDE 125 MILLILITER(S): 9 INJECTION, SOLUTION INTRAVENOUS at 14:19

## 2020-03-18 RX ADMIN — Medication 100 MILLIGRAM(S): at 21:58

## 2020-03-18 RX ADMIN — Medication 0.2 MILLIGRAM(S): at 23:54

## 2020-03-18 RX ADMIN — Medication 30 MILLIGRAM(S): at 22:02

## 2020-03-18 RX ADMIN — Medication 0.2 MILLIGRAM(S): at 20:08

## 2020-03-18 RX ADMIN — SODIUM CHLORIDE 3 MILLILITER(S): 9 INJECTION INTRAMUSCULAR; INTRAVENOUS; SUBCUTANEOUS at 23:36

## 2020-03-18 RX ADMIN — Medication 2 MILLIUNIT(S)/MIN: at 17:04

## 2020-03-18 RX ADMIN — Medication 50 MICROGRAM(S): at 08:09

## 2020-03-18 RX ADMIN — Medication 30 MILLIGRAM(S): at 22:30

## 2020-03-18 RX ADMIN — SODIUM CHLORIDE 2000 MILLILITER(S): 9 INJECTION, SOLUTION INTRAVENOUS at 06:58

## 2020-03-18 RX ADMIN — SODIUM CHLORIDE 1000 MILLILITER(S): 9 INJECTION, SOLUTION INTRAVENOUS at 20:35

## 2020-03-18 NOTE — OB PROVIDER DELIVERY SUMMARY - NSPROVIDERDELIVERYNOTE_OBGYN_ALL_OB_FT
Spontaneous vaginal delivery of liveborn infant from OA position. Head, shoulders, and body delivered easily. Infant was suctioned. No mec. Delayed cord clamping and infant was passed to mother. Cord clamped and cut. Cord avulsion, placenta extracted manually with trailing membranes grasped by ring forceps. Fundal massage was given and uterine fundus was found to be firm. Pt was given Cytotec [place] and started on methergine series for LAILA atony. Vaginal exam revealed an intact cervix, vaginal walls and sulci. Patient had a 2nd degree laceration in the vagina that was repaired with 2.0 chromic suture. Rectal exam performed, sphincter intact with no sutures felt. Excellent hemostasis was noted. Patient was stable and went to recovery. Count was correct x 2. Spontaneous vaginal delivery of liveborn infant from OA position. Head, shoulders, and body delivered easily. Infant was suctioned. No mec. Delayed cord clamping and infant was passed to mother. Cord clamped and cut. Cord avulsion noted, placenta extracted manually with trailing membranes grasped by ring forceps. Fundal massage was given and uterine fundus was found to be firm. Pt was given Cytotec [place] and started on methergine series for LAILA atony. Vaginal exam revealed an intact cervix, vaginal walls and sulci. Patient had a 2nd degree laceration in the vagina that was repaired with 2.0 chromic suture. Rectal exam performed, sphincter intact with no sutures felt. A right labial laceration was repaired with 3-0 chromic. Excellent hemostasis was noted. Patient was stable and went to recovery. Count was correct x 2.

## 2020-03-18 NOTE — OB PROVIDER H&P - ASSESSMENT
HPI:  Patient of Dr Wilkerson  31 y/o  female, para 0000 @ 41+0 weeks gestation, single intrauterine IVF Pregnancy.  Presents to triage with c/o Irregular contractions q 6-8 min. since 1am.  Pt scheduled for IOL Tomorrow night.   Pt endorses strong fetal movement, denies any leakage of fluid or vaginal bleeding. GBS negative.  Assessment:  31 y/o  female, para 0000 @ 41+0 weeks gestation, single intrauterine IVF Pregnancy.  Late term IOL  Gen: NAD, A+O x3  Cardiac: R/R/R  Pulm: CTAB  Abdomen: Gravid, soft bt ctx, non-tender, mild ctx palpated  VS--BP: 108/71, P68, RR 18, T 36.7, Pain 9/10-Pt requesting pain meds  Cat 1 tracin bpm, moderate variability, + accels, no deels  [place]: Irregular q 6-7 min  SVE: 3/50/-3, intact membranes  GBS Negative- 2020  Clinical EFW 3175g  Limited TAS: SLIUP, Cephalic, Anterior placenta, BPP 8/8, GERA 9.64 cm  Plan of care d/w Dr Deluca  Report off to Dr Conway, PGY-3

## 2020-03-18 NOTE — OB RN DELIVERY SUMMARY - NS_SEPSISRSKCALC_OBGYN_ALL_OB_FT
EOS calculated successfully. EOS Risk Factor: 0.5/1000 live births ([facility] national incidence); GA=41w;Temp=98.1; ROM=1.017; GBS='Unknown'; Antibiotics='No antibiotics or any antibiotics < 2 hrs prior to birth'

## 2020-03-18 NOTE — OB PROVIDER TRIAGE NOTE - NSHPLABSRESULTS_GEN_ALL_CORE
Vital Signs Last 24 Hrs  T(C): 36.7 (18 Mar 2020 04:01), Max: 36.7 (18 Mar 2020 03:55)  T(F): 98.06 (18 Mar 2020 04:01), Max: 98.1 (18 Mar 2020 03:55)  HR: 65 (18 Mar 2020 04:51) (65 - 73)  BP: 108/71 (18 Mar 2020 04:05) (108/71 - 108/71)  BP(mean): --  RR: 18 (18 Mar 2020 03:55) (18 - 18)  SpO2: 96% (18 Mar 2020 04:51) (93% - 98%)

## 2020-03-18 NOTE — OB PROVIDER H&P - FAMILY HISTORY
Mother  Still living? Unknown  Family history of breast cancer, Age at diagnosis: Age Unknown     Grandparent  Still living? Unknown  Family history of breast cancer, Age at diagnosis: Age Unknown     Aunt  Still living? Unknown  Family history of ovarian cancer, Age at diagnosis: Age Unknown

## 2020-03-18 NOTE — OB PROVIDER H&P - HISTORY OF PRESENT ILLNESS
Patient of Dr Wilkerson  29 y/o  female, para 0000 @ 41+0 weeks gestation, single intrauterine IVF Pregnancy.  Presents to triage with c/o Irregular contractions q 6-8 min. since 1am.  Pt scheduled for IOL Tomorrow night.   Pt endorses strong fetal movement, denies any leakage of fluid or vaginal bleeding. GBS negative.    A/P Complications: Low ANKIT A  Allergies: Penicillin (Anaphylaxis)  Meds: PNV; Synthroid 50mcg;  mg daily  PMH: Hypothyroidism  PSH: Breast Augmentation ('09)  OBgynHx    IVF pregnancy  Uterine Fibroids 5 cm x 2.  Breast augmentation  Pt denies any h/o: Abn. PAP, ovarian cysts, breast problems, STDs/STIs  PSY: Denies  EtOH/ Smoke/ Recreational substance use: Denies  FH: Breast Cancer (Mother, MGM); Ovarian cancer (Aunt)-Patient BRCA negative.  H/W/BMI: 63"/172#/30.5

## 2020-03-18 NOTE — OB PROVIDER TRIAGE NOTE - NSOBPROVIDERNOTE_OBGYN_ALL_OB_FT
Patient with Cat 1 tracin bpm, moderate variability, +accels, no decels. [place]: Irregular. Biophysical profile reassuring. Re-Pelvic for c/o increased ctx intensity VE: 350/-3, intact,  Dr Deluca notified of above findings  Admit to L&D  See H&P

## 2020-03-18 NOTE — CHART NOTE - NSCHARTNOTEFT_GEN_A_CORE
Patient seen and examined at bedside. Reports feeling pressure. VE: 10/100+1, AROM clear fluid. To start pushing.

## 2020-03-18 NOTE — OB PROVIDER TRIAGE NOTE - NS_OBGYNHISTORY_OBGYN_ALL_OB_FT
IVF pregnancy  Uterine Fibroids 5 cm x 2.  Breast augmentation  Pt denies any h/o: Abn. PAP, ovarian cysts, breast problems, STDs/STIs

## 2020-03-18 NOTE — OB PROVIDER H&P - PROBLEM SELECTOR PLAN 1
Admit to L&D  -For PO Cytotec  -Routine labs  -Approved for epidural anesthesia  -Anesthesia consult

## 2020-03-18 NOTE — DISCHARGE NOTE OB - CARE PROVIDER_API CALL
Diane Ca (MD)  Obstetrics and Gynecology Obstetrics and Gynocology  [address] [place] [address]  Phone: [phone]  Fax: [phone]  Follow Up Time:

## 2020-03-18 NOTE — OB NEONATOLOGY/PEDIATRICIAN DELIVERY SUMMARY - NSPEDSNEONOTESA_OBGYN_ALL_OB_FT
Baby girl born at 41 wks via  to a 31 y/o  blood type O- mother. Mother recieved rhogam at 28wks. Maternal history of breast augmentation, low Pap A, and hypothyroidism on synthroid. Prenatal history of small abdomen on U/S. PNL nr/immune/-, GBS - on  (). ROM at 19:00 on DOD with clear fluids. Peds called to delivery for cat II tracing. Baby emerged vigorous, crying, was w/d/s/s with APGARS of 9,9. Mom would like to breastfeed, declines Hep B. EOS 0.05. Delivery attended by NICU fellow.

## 2020-03-18 NOTE — OB RN TRIAGE NOTE - PMH
Abnormal cervical Papanicolaou smear, unspecified abnormal pap finding  low pap A  Fibroids  5cm x2  Hypothyroid  rx with levothyroxine

## 2020-03-18 NOTE — OB PROVIDER TRIAGE NOTE - NSHPPHYSICALEXAM_GEN_ALL_CORE
31 y/o  female, para 0000 @ 41+0 weeks gestation, single intrauterine IVF Pregnancy.  Early labor  Gen: NAD, A+O x3  Cardiac: R/R/R  Pulm: CTAB  Abdomen: Gravid, soft bt ctx, non-tender, mild ctx palpated  VS--BP: 108/71, P68, RR 18, T 36.7, Pain 9/10-Pt requesting pain meds  NST in progress: 110 bpm, moderate variability, no accels, no deels  [place]: Irregular q 6-7 min  SVE: 2/50/-3, intact membranes  GBS Negative  Clinical EFW 3175g  Limited TAS: SLIUP, Cephalic, Anterior placenta, BPP 8/8, GERA 9.64 cm

## 2020-03-18 NOTE — OB PROVIDER TRIAGE NOTE - FAMILY HISTORY
Postpartum Note,  Section  She is a  25y woman who is now post-operative day: 2    Subjective:  The patient feels well.  She is ambulating.   She is tolerating regular diet.  She denies nausea and vomiting.  She is voiding.  Her pain is controlled.  She reports normal postpartum bleeding    Physical exam:    Vital Signs Last 24 Hrs  T(C): 36.5 (15 Nov 2018 05:21), Max: 37.1 (2018 21:05)  T(F): 97.7 (15 Nov 2018 05:21), Max: 98.7 (2018 21:05)  HR: 59 (15 Nov 2018 05:21) (59 - 91)  BP: 94/59 (15 Nov 2018 05:21) (94/59 - 110/60)  BP(mean): --  RR: 18 (15 Nov 2018 05:21) (16 - 18)  SpO2: 97% (15 Nov 2018 05:21) (97% - 98%)    Gen: NAD  Breast: Soft, nontender, not engorged.  Abdomen: Soft, nontender, no distension , firm uterine fundus at umbilicus.  Incision: Clean, dry, and intact with steri strips  Pelvic: Normal lochia noted  Ext: No calf tenderness    LABS:                        11.3   11.85 )-----------( 134      ( 2018 13:23 )             33.8     ABO Interpretation: B ( @ 13:04)  Rh Interpretation: Positive ( @ 13:04)  Antibody Screen: Negative ( @ 13:04)    Allergies    No Known Allergies    Intolerances      MEDICATIONS  (STANDING):  acetaminophen   Tablet .. 975 milliGRAM(s) Oral every 6 hours  diphtheria/tetanus/pertussis (acellular) Vaccine (ADAcel) 0.5 milliLiter(s) IntraMuscular once  ibuprofen  Tablet. 600 milliGRAM(s) Oral every 6 hours  oxyCODONE    IR 5 milliGRAM(s) Oral every 3 hours  prenatal multivitamin 1 Tablet(s) Oral daily  sodium chloride 0.9% lock flush 3 milliLiter(s) IV Push every 8 hours  tobramycin 0.3% Ophthalmic Solution 1 Drop(s) Both EYES every 4 hours    MEDICATIONS  (PRN):  dibucaine 1% Ointment 1 Application(s) Topical every 4 hours PRN Perineal Discomfort  diphenhydrAMINE 25 milliGRAM(s) Oral every 6 hours PRN Itching  docusate sodium 100 milliGRAM(s) Oral two times a day PRN Stool Softening  glycerin Suppository - Adult 1 Suppository(s) Rectal at bedtime PRN Constipation  hydrocortisone 1% Cream 1 Application(s) Topical every 4 hours PRN perineal discomfort  lanolin Ointment 1 Application(s) Topical every 6 hours PRN Sore Nipples  magnesium hydroxide Suspension 30 milliLiter(s) Oral two times a day PRN Constipation  oxyCODONE    IR 5 milliGRAM(s) Oral every 4 hours PRN Severe Pain (7 -10)  pramoxine 1%/zinc 5% Cream 1 Application(s) Topical every 4 hours PRN perineal discomfort  simethicone 80 milliGRAM(s) Chew every 6 hours PRN Gas  witch hazel Pads 1 Application(s) Topical every 4 hours PRN Perineal Discomfort        Assessment and Plan  POD #2  s/p  section  Doing well.  Encourage ambulation.
Mother  Still living? Unknown  Family history of breast cancer, Age at diagnosis: Age Unknown     Grandparent  Still living? Unknown  Family history of breast cancer, Age at diagnosis: Age Unknown     Aunt  Still living? Unknown  Family history of ovarian cancer, Age at diagnosis: Age Unknown

## 2020-03-18 NOTE — OB PROVIDER H&P - NSHPPHYSICALEXAM_GEN_ALL_CORE
31 y/o  female, para 0000 @ 41+0 weeks gestation, single intrauterine IVF Pregnancy.  Late term IOL  Gen: NAD, A+O x3  Cardiac: R/R/R  Pulm: CTAB  Abdomen: Gravid, soft bt ctx, non-tender, mild ctx palpated  VS--BP: 108/71, P68, RR 18, T 36.7, Pain 9/10-Pt requesting pain meds  Cat 1 tracin bpm, moderate variability, + accels, no deels  [place]: Irregular q 6-7 min  SVE: 3/50/-3, intact membranes  GBS Negative- 2020  Clinical EFW 3175g  Limited TAS: SLIUP, Cephalic, Anterior placenta, BPP 8/8, GERA 9.64 cm

## 2020-03-18 NOTE — CHART NOTE - NSCHARTNOTEFT_GEN_A_CORE
Patient seen and examined at bedside. Comfortable with epidural. VE: 3/80/-3/bulging bag. Tracing reviewed - Cat 1 tracing, contractions not picking up well, tocometer adjusted. Reexamine in 2 hours, possibly switch to pitocin at that time.

## 2020-03-18 NOTE — DISCHARGE NOTE OB - PATIENT PORTAL LINK FT
You can access the FollowMyHealth Patient Portal offered by [facility] by registering at the following website: [email or web address]. By joining [facility]’s FollowMyHealth portal, you will also be able to view your health information using other applications (apps) compatible with our system.

## 2020-03-19 LAB
HCT VFR BLD CALC: 33.9 % — LOW (ref 34.5–45)
HGB BLD-MCNC: 11.8 G/DL — SIGNIFICANT CHANGE UP (ref 11.5–15.5)

## 2020-03-19 RX ORDER — ASPIRIN/CALCIUM CARB/MAGNESIUM 324 MG
2 TABLET ORAL
Qty: 0 | Refills: 0 | DISCHARGE

## 2020-03-19 RX ORDER — IBUPROFEN 200 MG
600 TABLET ORAL EVERY 6 HOURS
Refills: 0 | Status: DISCONTINUED | OUTPATIENT
Start: 2020-03-19 | End: 2020-03-20

## 2020-03-19 RX ORDER — LEVOTHYROXINE SODIUM 125 MCG
1 TABLET ORAL
Qty: 0 | Refills: 0 | DISCHARGE

## 2020-03-19 RX ADMIN — Medication 600 MILLIGRAM(S): at 09:11

## 2020-03-19 RX ADMIN — Medication 0.2 MILLIGRAM(S): at 16:07

## 2020-03-19 RX ADMIN — Medication 600 MILLIGRAM(S): at 16:50

## 2020-03-19 RX ADMIN — Medication 975 MILLIGRAM(S): at 02:00

## 2020-03-19 RX ADMIN — Medication 975 MILLIGRAM(S): at 06:43

## 2020-03-19 RX ADMIN — Medication 600 MILLIGRAM(S): at 09:50

## 2020-03-19 RX ADMIN — Medication 600 MILLIGRAM(S): at 22:30

## 2020-03-19 RX ADMIN — Medication 100 MILLIGRAM(S): at 06:44

## 2020-03-19 RX ADMIN — SODIUM CHLORIDE 3 MILLILITER(S): 9 INJECTION INTRAMUSCULAR; INTRAVENOUS; SUBCUTANEOUS at 05:50

## 2020-03-19 RX ADMIN — Medication 0.2 MILLIGRAM(S): at 08:11

## 2020-03-19 RX ADMIN — Medication 0.2 MILLIGRAM(S): at 12:02

## 2020-03-19 RX ADMIN — Medication 975 MILLIGRAM(S): at 23:45

## 2020-03-19 RX ADMIN — Medication 975 MILLIGRAM(S): at 12:35

## 2020-03-19 RX ADMIN — Medication 50 MICROGRAM(S): at 06:44

## 2020-03-19 RX ADMIN — Medication 975 MILLIGRAM(S): at 01:30

## 2020-03-19 RX ADMIN — Medication 975 MILLIGRAM(S): at 17:43

## 2020-03-19 RX ADMIN — Medication 600 MILLIGRAM(S): at 21:34

## 2020-03-19 RX ADMIN — Medication 100 MILLIGRAM(S): at 23:44

## 2020-03-19 RX ADMIN — Medication 975 MILLIGRAM(S): at 18:15

## 2020-03-19 RX ADMIN — Medication 975 MILLIGRAM(S): at 12:02

## 2020-03-19 RX ADMIN — Medication 600 MILLIGRAM(S): at 16:07

## 2020-03-19 RX ADMIN — Medication 0.2 MILLIGRAM(S): at 04:03

## 2020-03-19 NOTE — PROGRESS NOTE ADULT - SUBJECTIVE AND OBJECTIVE BOX
OB Progress Note:  PPD#1    S: 29yo PPD#1 s/p  w/ manual extraction of placenta on G/C and Methergine series and cytotec pr 2/2 EBL. Patient feels well. Pain is well controlled. She is tolerating a regular diet and passing flatus. She is voiding spontaneously, and ambulating without difficulty. Endorses light vaginal bleeding, soaking less than 1 pad/hour. Denies CP/SOB. Denies lightheadedness/dizziness. Denies N/V.     O:  Vitals:  Vital Signs Last 24 Hrs  T(C): 36.7 (19 Mar 2020 06:13), Max: 36.7 (18 Mar 2020 10:28)  T(F): 98 (19 Mar 2020 06:13), Max: 98.06 (18 Mar 2020 10:28)  HR: 62 (19 Mar 2020 06:13) (57 - 105)  BP: 112/73 (19 Mar 2020 06:13) (86/50 - 121/57)  BP(mean): --  RR: 17 (19 Mar 2020 06:13) (16 - 17)  SpO2: 99% (19 Mar 2020 06:13) (87% - 100%)    MEDICATIONS  (STANDING):  acetaminophen   Tablet .. 975 milliGRAM(s) Oral <User Schedule>  clindamycin IVPB      clindamycin IVPB 600 milliGRAM(s) IV Intermittent every 8 hours  diphtheria/tetanus/pertussis (acellular) Vaccine (ADAcel) 0.5 milliLiter(s) IntraMuscular once  ibuprofen  Tablet. 600 milliGRAM(s) Oral every 6 hours  levothyroxine 50 MICROGram(s) Oral daily  methylergonovine 0.2 milliGRAM(s) Oral every 4 hours  prenatal multivitamin 1 Tablet(s) Oral daily  sodium chloride 0.9% lock flush 3 milliLiter(s) IV Push every 8 hours      Labs:  Blood type: O Negative  Rubella IgG: RPR: Negative                          11.8   -- >-----------< --    (  @ 06:58 )             33.9<L>                        11.7   17.79<H> >-----------< 193    (  @ 22:26 )             34.4<L>                        13.5   11.89<H> >-----------< 217    (  @ 06:19 )             38.7                  Physical Exam:  General: NAD  Heart: all extremities well perfused  Lungs: breathing comfortably  Abdomen: soft, non-tender, non-distended, fundus firm  Vaginal: lochia wnl  Extremities: No calf tenderness or erythema

## 2020-03-19 NOTE — PROGRESS NOTE ADULT - PROBLEM SELECTOR PLAN 1
- Pain well controlled, continue current pain regimen  - Continue ambulation, SCDs when not ambulating  - Continue regular diet  - Continue methergine series  - Continue G/C x 24 h      Juan Manuel Cardenas, PGY-1

## 2020-03-19 NOTE — LACTATION INITIAL EVALUATION - INTERVENTION OUTCOME
Assisted pt with positioning- less than 24 hours old and sleepy. Reviewed feeding on demand, recognizing feeding cues and utilizing feeding log. Safe skin to skin, safe sleep and rooming in promoted. Hand expression demonstrated and encouraged-colostrum noted./good return demonstration/verbalizes understanding/demonstrates understanding of teaching

## 2020-03-19 NOTE — PROGRESS NOTE ADULT - SUBJECTIVE AND OBJECTIVE BOX
ANESTHESIA POSTOP CHECK    30y (1990) Female POSTOP DAY 1   T(C): 36.9 (03-19-20 @ 17:37)  HR: 83 (03-19-20 @ 17:37)  BP: 107/70 (03-19-20 @ 17:37) (90/52 - 121/57)  RR: 18 (03-19-20 @ 17:37) (16 - 18)  SpO2: 100% (03-19-20 @ 17:37)  F  NO APPARENT ANESTHESIA COMPLICATIONS

## 2020-03-20 VITALS
SYSTOLIC BLOOD PRESSURE: 117 MMHG | RESPIRATION RATE: 19 BRPM | OXYGEN SATURATION: 99 % | HEART RATE: 65 BPM | DIASTOLIC BLOOD PRESSURE: 67 MMHG | TEMPERATURE: 98 F

## 2020-03-20 LAB
HCT VFR BLD CALC: 31.7 % — LOW (ref 34.5–45)
HGB BLD-MCNC: 11 G/DL — LOW (ref 11.5–15.5)
MCHC RBC-ENTMCNC: 31.2 PG — SIGNIFICANT CHANGE UP (ref 27–34)
MCHC RBC-ENTMCNC: 34.7 % — SIGNIFICANT CHANGE UP (ref 32–36)
MCV RBC AUTO: 89.8 FL — SIGNIFICANT CHANGE UP (ref 80–100)
NRBC # FLD: 0 K/UL — SIGNIFICANT CHANGE UP (ref 0–0)
PLATELET # BLD AUTO: 214 K/UL — SIGNIFICANT CHANGE UP (ref 150–400)
PMV BLD: 10.9 FL — SIGNIFICANT CHANGE UP (ref 7–13)
RBC # BLD FETUS AUTO: 0 — SIGNIFICANT CHANGE UP
RBC # BLD FETUS AUTO: SIGNIFICANT CHANGE UP
RBC # BLD: 3.53 M/UL — LOW (ref 3.8–5.2)
RBC # FLD: 13 % — SIGNIFICANT CHANGE UP (ref 10.3–14.5)
WBC # BLD: 11.74 K/UL — HIGH (ref 3.8–10.5)
WBC # FLD AUTO: 11.74 K/UL — HIGH (ref 3.8–10.5)

## 2020-03-20 RX ADMIN — Medication 975 MILLIGRAM(S): at 00:45

## 2020-03-20 RX ADMIN — Medication 975 MILLIGRAM(S): at 10:38

## 2020-03-20 RX ADMIN — Medication 600 MILLIGRAM(S): at 05:36

## 2020-03-20 RX ADMIN — Medication 600 MILLIGRAM(S): at 06:21

## 2020-03-20 RX ADMIN — Medication 50 MICROGRAM(S): at 05:36

## 2020-03-20 RX ADMIN — Medication 975 MILLIGRAM(S): at 11:25

## 2020-03-20 RX ADMIN — SODIUM CHLORIDE 3 MILLILITER(S): 9 INJECTION INTRAMUSCULAR; INTRAVENOUS; SUBCUTANEOUS at 05:18

## 2020-03-20 NOTE — PROGRESS NOTE ADULT - SUBJECTIVE AND OBJECTIVE BOX
Subjective  Pain: well controlled  Complaints: none  Milestones: Alert and orientedx3. Out of bed ambulating. Voiding/Due to void. Tolerating a regular diet.  Infant feeding:    breast                             Feeding related issues or concerns: none    Objective   T(C): 36.7 (03-20-20 @ 06:29), Max: 36.9 (03-19-20 @ 17:37)  HR: 65 (03-20-20 @ 06:29) (65 - 83)  BP: 117/67 (03-20-20 @ 06:29) (107/70 - 117/67)  RR: 19 (03-20-20 @ 06:29) (18 - 19)  SpO2: 99% (03-20-20 @ 06:29) (99% - 100%)  Wt(kg): --      Assessment    31 y/o G1  P 1 .Day # 2 postpartum. EBL 500cc manual extraction of placenta s/p C/G, s/p methergine series  Assisted delivery (vacuum, forceps):n/a  Indication for assisted delivery:  Past medical history significant for none  Current Issues: none  Breasts:soft  Abdomen: Soft, nontender, nondistended.  Vagina: Lochia moderate  Perineum:  2nd degree laceration,  Laceration/episiotomy site: clean  Lower extremities: No edema noted bilaterally of lower extremities. Nontender calves.  Negative Nichelle's sign. Positive pedal pulses.  Other relevant physical exam findings;none      Plan  Plan: Increase ambulation, analgesia PRN and pain medication protocal standing oxycodone, ibuprofen and acetaminophen.  Diet: Continue regular diet  Labs:nl  Continue routine postpartum care.

## 2020-03-24 PROBLEM — Z31.83 ENCOUNTER FOR ASSISTED REPRODUCTIVE FERTILITY PROCEDURE CYCLE: Chronic | Status: ACTIVE | Noted: 2020-03-18

## 2020-03-24 PROBLEM — D21.9 BENIGN NEOPLASM OF CONNECTIVE AND OTHER SOFT TISSUE, UNSPECIFIED: Chronic | Status: ACTIVE | Noted: 2020-01-29

## 2020-03-27 ENCOUNTER — APPOINTMENT (OUTPATIENT)
Dept: DISASTER EMERGENCY | Facility: CLINIC | Age: 30
End: 2020-03-27
Payer: COMMERCIAL

## 2020-03-27 VITALS
DIASTOLIC BLOOD PRESSURE: 80 MMHG | RESPIRATION RATE: 16 BRPM | SYSTOLIC BLOOD PRESSURE: 110 MMHG | TEMPERATURE: 97 F | HEART RATE: 90 BPM | OXYGEN SATURATION: 98 %

## 2020-03-27 DIAGNOSIS — R68.89 OTHER GENERAL SYMPTOMS AND SIGNS: ICD-10-CM

## 2020-03-27 PROCEDURE — 99213 OFFICE O/P EST LOW 20 MIN: CPT

## 2020-03-27 NOTE — REVIEW OF SYSTEMS
[Fever] : fever [Fatigue] : fatigue [Muscle Pain] : muscle pain [Nasal Discharge] : no nasal discharge [Sore Throat] : no sore throat [Chest Pain] : no chest pain [Palpitations] : no palpitations [Lower Ext Edema] : no lower extremity edema [Shortness Of Breath] : no shortness of breath [Wheezing] : no wheezing [Cough] : no cough [Abdominal Pain] : no abdominal pain [Nausea] : no nausea [Constipation] : no constipation [Diarrhea] : diarrhea

## 2020-03-27 NOTE — PHYSICAL EXAM
[No Acute Distress] : no acute distress [Well Nourished] : well nourished [Normal Sclera/Conjunctiva] : normal sclera/conjunctiva [PERRL] : pupils equal round and reactive to light [Normal Outer Ear/Nose] : the outer ears and nose were normal in appearance [Normal] : normal rate, regular rhythm, normal S1 and S2 and no murmur heard [Soft] : abdomen soft [Non Tender] : non-tender [Non-distended] : non-distended [Coordination Grossly Intact] : coordination grossly intact

## 2020-03-27 NOTE — HISTORY OF PRESENT ILLNESS
[FreeTextEntry8] : 30 year old female presents with complaints of fever and body aches x4 days. Denies cough. Positive COVID19 family member.

## 2020-04-02 PROBLEM — R68.89 SUSPECTED 2019 NOVEL CORONAVIRUS INFECTION: Status: ACTIVE | Noted: 2020-03-27

## 2020-04-07 LAB — SARS-COV-2 N GENE NPH QL NAA+PROBE: NOT DETECTED

## 2020-04-17 ENCOUNTER — APPOINTMENT (OUTPATIENT)
Dept: FAMILY MEDICINE | Facility: CLINIC | Age: 30
End: 2020-04-17
Payer: COMMERCIAL

## 2020-04-17 PROCEDURE — 99204 OFFICE O/P NEW MOD 45 MIN: CPT | Mod: 95

## 2020-04-17 NOTE — ASSESSMENT
[FreeTextEntry1] : thyroid\par Patient had a diagnosis of thyroid disorder.  Patient will continue on current dose of medications at this time unless instructed otherwise. Patient was advised to take thyroid medications on a empty stomach and to wait at least 45 minutes before eating for appropriate absorption.\par followed by donavon, no longer on synthroid, possibly due to pregnancy\par \par anxiety and insomnia\par Discussed diagnosis of anxiety with the patient and potential outcomes/side affects of treatment versus non treatment. Medications were assessed and described at length. Side affects and black box warning were discussed.  Patient was advised to not stop medications without discussing with a health care provider first. Patient was advised to continue psychotherapy or seek therapy if not currently attending. Patient was educated on addictive potential of controlled substances and was counseled to use only as needed and sparingly. Patient verbalized understanding of all the above.\par patient having panic and anxiety due to new baby at home, anxiety due to covid\par used father's ativan and did very well\par will give, black box warning

## 2020-04-17 NOTE — HISTORY OF PRESENT ILLNESS
[FreeTextEntry8] : 30 year old female here to establish care and with complaints of anxiety. Patients active medications, allergies and issues were all reviewed with the patient at time of visit.\par

## 2020-04-17 NOTE — PHYSICAL EXAM
[No Acute Distress] : no acute distress [Well Nourished] : well nourished [Well Developed] : well developed [Well-Appearing] : well-appearing [No JVD] : no jugular venous distention [No Respiratory Distress] : no respiratory distress  [Speech Grossly Normal] : speech grossly normal [Memory Grossly Normal] : memory grossly normal [Normal Affect] : the affect was normal [Normal Mood] : the mood was normal [Normal Insight/Judgement] : insight and judgment were intact

## 2020-04-17 NOTE — HEALTH RISK ASSESSMENT
[] : No [Yes] : Yes [No falls in past year] : Patient reported no falls in the past year [0] : 2) Feeling down, depressed, or hopeless: Not at all (0) [[id number]] : 0

## 2020-05-06 ENCOUNTER — APPOINTMENT (OUTPATIENT)
Dept: FAMILY MEDICINE | Facility: CLINIC | Age: 30
End: 2020-05-06
Payer: COMMERCIAL

## 2020-05-06 DIAGNOSIS — R68.89 OTHER GENERAL SYMPTOMS AND SIGNS: ICD-10-CM

## 2020-05-06 PROCEDURE — 99214 OFFICE O/P EST MOD 30 MIN: CPT | Mod: 95

## 2020-05-06 NOTE — HISTORY OF PRESENT ILLNESS
[Home] : at home, [unfilled] , at the time of the visit. [Medical Office: ([facility])___] : at the medical office located in  [Patient] : the patient [Self] : self [FreeTextEntry8] : 30 year old female is here for a followup visit.  There has been no new medications since the last visit. Patient is feeling well with no active changes or issues since Her last visit.\par

## 2020-05-06 NOTE — ASSESSMENT
[FreeTextEntry1] : thyroid\par Patient had a diagnosis of thyroid disorder.  Patient will continue on current dose of medications at this time unless instructed otherwise. Patient was advised to take thyroid medications on a empty stomach and to wait at least 45 minutes before eating for appropriate absorption.\par followed by donavon, no longer on synthroid, possibly due to pregnancy\par \par anxiety and insomnia\par Discussed diagnosis of anxiety with the patient and potential outcomes/side affects of treatment versus non treatment. Medications were assessed and described at length. Side affects and black box warning were discussed.  Patient was advised to not stop medications without discussing with a health care provider first. Patient was advised to continue psychotherapy or seek therapy if not currently attending. Patient was educated on addictive potential of controlled substances and was counseled to use only as needed and sparingly. Patient verbalized understanding of all the above.\par patient having panic and anxiety due to new baby at home, anxiety due to covid\par will give, black box warning\par tried ativan which has helped immensely, has been taking regularly at night and sometimes only as needed in the morning\par if patient is finding that she needs ativan twice a day, will consider ssri

## 2020-05-06 NOTE — HEALTH RISK ASSESSMENT
[Yes] : Yes [No falls in past year] : Patient reported no falls in the past year [0] : 2) Feeling down, depressed, or hopeless: Not at all (0) [] : No [[id number]] : 0

## 2020-05-16 ENCOUNTER — LABORATORY RESULT (OUTPATIENT)
Age: 30
End: 2020-05-16

## 2020-05-18 ENCOUNTER — APPOINTMENT (OUTPATIENT)
Dept: FAMILY MEDICINE | Facility: CLINIC | Age: 30
End: 2020-05-18
Payer: COMMERCIAL

## 2020-05-18 LAB
T3 SERPL-MCNC: 85 NG/DL
T3RU NFR SERPL: 1 TBI
T4 FREE SERPL-MCNC: 1.3 NG/DL
T4 SERPL-MCNC: 7.2 UG/DL
TSH SERPL-ACNC: 0.94 UIU/ML

## 2020-05-18 PROCEDURE — 99215 OFFICE O/P EST HI 40 MIN: CPT | Mod: 95

## 2020-05-18 RX ORDER — LORAZEPAM 0.5 MG/1
0.5 TABLET ORAL
Qty: 45 | Refills: 0 | Status: DISCONTINUED | COMMUNITY
Start: 2020-04-17 | End: 2020-05-18

## 2020-05-18 RX ORDER — NORETHINDRONE ACETATE AND ETHINYL ESTRADIOL, ETHINYL ESTRADIOL AND FERROUS FUMARATE 1MG-10(24)
1 MG-10 MCG / KIT ORAL
Qty: 84 | Refills: 0 | Status: COMPLETED | COMMUNITY
Start: 2020-04-30

## 2020-05-18 RX ORDER — AZITHROMYCIN 250 MG/1
250 TABLET, FILM COATED ORAL
Qty: 6 | Refills: 0 | Status: COMPLETED | COMMUNITY
Start: 2019-12-06

## 2020-05-18 NOTE — REVIEW OF SYSTEMS
[Anxiety] : anxiety [Insomnia] : insomnia [Depression] : depression [Negative] : Heme/Lymph [de-identified] : severe, anxious, ocd, thoughts, insomnia

## 2020-05-18 NOTE — HISTORY OF PRESENT ILLNESS
[Home] : at home, [unfilled] , at the time of the visit. [Patient] : the patient [Medical Office: ([facility])___] : at the medical office located in  [Self] : self [FreeTextEntry8] : 30 year old female is here for a followup visit for post pardum depression.  There has been no new medications since the last visit. Patient is feeling well with no active changes or issues since Her last visit\par baby born 3/18/2020 - baby girl\par suffering from severe anxiety, depression, insomnia, constant thoughts, ocd.\par

## 2020-05-18 NOTE — PHYSICAL EXAM
[No Acute Distress] : no acute distress [Well-Appearing] : well-appearing [Well Developed] : well developed [Well Nourished] : well nourished [Speech Grossly Normal] : speech grossly normal [No Respiratory Distress] : no respiratory distress  [No JVD] : no jugular venous distention [Normal Affect] : the affect was normal [Memory Grossly Normal] : memory grossly normal [Normal Mood] : the mood was normal [Normal Insight/Judgement] : insight and judgment were intact

## 2020-05-18 NOTE — HEALTH RISK ASSESSMENT
[] : No [Yes] : Yes [No falls in past year] : Patient reported no falls in the past year [0] : 2) Feeling down, depressed, or hopeless: Not at all (0) [[id number]] : 0

## 2020-05-20 RX ORDER — ESCITALOPRAM OXALATE 10 MG/1
10 TABLET ORAL
Qty: 30 | Refills: 1 | Status: DISCONTINUED | COMMUNITY
Start: 2020-05-18 | End: 2020-05-20

## 2020-05-22 ENCOUNTER — APPOINTMENT (OUTPATIENT)
Dept: FAMILY MEDICINE | Facility: CLINIC | Age: 30
End: 2020-05-22

## 2020-06-30 ENCOUNTER — APPOINTMENT (OUTPATIENT)
Dept: ENDOCRINOLOGY | Facility: CLINIC | Age: 30
End: 2020-06-30

## 2021-04-15 NOTE — OB RN DELIVERY SUMMARY - NSCORDSECONDSA_OBGYN_A_OB_FT
60

## 2021-05-14 NOTE — OB RN DELIVERY SUMMARY - NS_FINALEDD_OBGYN_ALL_OB_DT
11-Mar-2020 Area H Indication Text: Tumors in this location are included in Area H (eyelids, eyebrows, nose, lips, chin, ear, pre-auricular, post-auricular, temple, genitalia, hands, feet, ankles and areola).  Tissue conservation is critical in these anatomic locations.

## 2021-12-14 ENCOUNTER — APPOINTMENT (OUTPATIENT)
Dept: FAMILY MEDICINE | Facility: CLINIC | Age: 31
End: 2021-12-14

## 2022-01-01 NOTE — OB RN TRIAGE NOTE - TEMPERATURE IN CELSIUS (DEGREES C)
COPIED FROM MOTHER'S CHART    Chart reviewed - no needs identified. SW met with patient while social distancing w/appropriate PPE. Patient denies any history of postpartum depression/anxiety. Patient without a PCP and denied the need for assistance with obtaining a PCP. Patient given informational packet on  mood & anxiety disorders (resources/education). Family denies any additional needs from  at this time. Family has 's contact information should any needs/questions arise.     SARA Gregg, [address] [facility]   [phone] 36.7

## 2022-01-05 ENCOUNTER — APPOINTMENT (OUTPATIENT)
Dept: FAMILY MEDICINE | Facility: CLINIC | Age: 32
End: 2022-01-05
Payer: COMMERCIAL

## 2022-01-05 ENCOUNTER — RX RENEWAL (OUTPATIENT)
Age: 32
End: 2022-01-05

## 2022-01-05 DIAGNOSIS — Z86.16 PERSONAL HISTORY OF COVID-19: ICD-10-CM

## 2022-01-05 PROCEDURE — 99213 OFFICE O/P EST LOW 20 MIN: CPT | Mod: 95

## 2022-01-05 RX ORDER — SERTRALINE HYDROCHLORIDE 50 MG/1
50 TABLET, FILM COATED ORAL
Qty: 30 | Refills: 1 | Status: DISCONTINUED | COMMUNITY
Start: 2020-05-20 | End: 2022-01-05

## 2022-01-05 RX ORDER — ASPIRIN 81 MG
81 TABLET, DELAYED RELEASE (ENTERIC COATED) ORAL
Refills: 0 | Status: DISCONTINUED | COMMUNITY
End: 2022-01-05

## 2022-01-05 RX ORDER — LEVOTHYROXINE SODIUM 0.05 MG/1
50 TABLET ORAL
Qty: 30 | Refills: 1 | Status: DISCONTINUED | COMMUNITY
End: 2022-01-05

## 2022-01-05 NOTE — PHYSICAL EXAM
[No Respiratory Distress] : no respiratory distress  [Normal] : affect was normal and insight and judgment were intact [de-identified] : No visible rash

## 2022-01-05 NOTE — PLAN
[FreeTextEntry1] : Resolving COVID 19 infection. \par Start flonase for PND and ear discomfort.\par Continue supportive care.  Advised to call if any worsening symptoms. \par Patient expressed understanding of plan.  \par

## 2022-01-05 NOTE — PHYSICAL EXAM
[No Respiratory Distress] : no respiratory distress  [Normal] : affect was normal and insight and judgment were intact [de-identified] : No visible rash

## 2022-01-05 NOTE — REVIEW OF SYSTEMS
[Earache] : earache [Sore Throat] : sore throat [Cough] : cough [Nausea] : nausea [Negative] : Genitourinary [Fever] : no fever [Chills] : no chills [Chest Pain] : no chest pain [Shortness Of Breath] : no shortness of breath [Diarrhea] : diarrhea [Vomiting] : no vomiting

## 2022-01-05 NOTE — HISTORY OF PRESENT ILLNESS
[Home] : at home, [unfilled] , at the time of the visit. [Medical Office: ([facility])___] : at the medical office located in  [Verbal consent obtained from patient] : the patient, [unfilled] [FreeTextEntry8] : Here for COVID 19 symptoms\par Taking MVI, Vit C, Vit B, Zinc\par \par -Tested positive on Tuesday last week. \par Started last Monday last week-body aches. Coughing and sneezing last week. \par Now with ear discomfort and sore throat, runny nose. \par LMP-Thursday\par Mucinex-Tylenol. Cold and Flu OTC. \par \par Has been doing salt water gargles. \par

## 2022-01-05 NOTE — HISTORY OF PRESENT ILLNESS
[Home] : at home, [unfilled] , at the time of the visit. [Medical Office: ([facility])___] : at the medical office located in  [Verbal consent obtained from patient] : the patient, [unfilled] [FreeTextEntry8] : Here for COVID 19 symptoms\par Taking MVI, Vit C, Vit B, Zinc\par \par -Tested positive on Tuesday last week. \par Started last Monday last week-body aches. Coughing and sneezing last week. \par Now with ear discomfort and sore throat, runny nose. \par LMP-Thursday\par Mucinex-Tylenol. Cold and Flu OTC. \par \par Has been doing salt water gargles. \par

## 2022-02-01 ENCOUNTER — RX RENEWAL (OUTPATIENT)
Age: 32
End: 2022-02-01

## 2022-02-01 RX ORDER — FLUTICASONE PROPIONATE 50 UG/1
50 SPRAY, METERED NASAL TWICE DAILY
Qty: 48 | Refills: 0 | Status: ACTIVE | COMMUNITY
Start: 2022-01-05 | End: 1900-01-01

## 2022-03-22 ENCOUNTER — NON-APPOINTMENT (OUTPATIENT)
Age: 32
End: 2022-03-22

## 2022-03-22 ENCOUNTER — APPOINTMENT (OUTPATIENT)
Dept: FAMILY MEDICINE | Facility: CLINIC | Age: 32
End: 2022-03-22
Payer: COMMERCIAL

## 2022-03-22 DIAGNOSIS — G47.00 INSOMNIA, UNSPECIFIED: ICD-10-CM

## 2022-03-22 PROCEDURE — 99214 OFFICE O/P EST MOD 30 MIN: CPT | Mod: 95

## 2022-03-22 RX ORDER — FAMOTIDINE 20 MG/1
20 TABLET, FILM COATED ORAL
Qty: 5 | Refills: 0 | Status: COMPLETED | COMMUNITY
Start: 2021-11-01

## 2022-03-22 RX ORDER — QUETIAPINE 25 MG/1
25 TABLET, FILM COATED ORAL
Refills: 0 | Status: DISCONTINUED | COMMUNITY
Start: 2022-01-05 | End: 2022-03-22

## 2022-03-22 RX ORDER — FLUOXETINE HYDROCHLORIDE 20 MG/1
20 TABLET ORAL DAILY
Refills: 0 | Status: DISCONTINUED | COMMUNITY
Start: 2022-01-05 | End: 2022-03-22

## 2022-03-22 RX ORDER — PREDNISONE 20 MG/1
20 TABLET ORAL
Qty: 15 | Refills: 0 | Status: COMPLETED | COMMUNITY
Start: 2021-11-01

## 2022-03-22 RX ORDER — EPINEPHRINE 0.3 MG/.3ML
0.3 INJECTION INTRAMUSCULAR
Qty: 2 | Refills: 0 | Status: COMPLETED | COMMUNITY
Start: 2021-11-01

## 2022-03-22 RX ORDER — CLONAZEPAM 0.5 MG/1
0.5 TABLET, ORALLY DISINTEGRATING ORAL
Qty: 60 | Refills: 0 | Status: COMPLETED | COMMUNITY
Start: 2021-12-06

## 2022-03-22 RX ORDER — DOXYCYCLINE HYCLATE 100 MG/1
100 CAPSULE ORAL
Qty: 20 | Refills: 0 | Status: COMPLETED | COMMUNITY
Start: 2021-10-11

## 2022-05-14 ENCOUNTER — APPOINTMENT (OUTPATIENT)
Dept: AFTER HOURS CARE | Facility: EMERGENCY ROOM | Age: 32
End: 2022-05-14
Payer: COMMERCIAL

## 2022-05-14 DIAGNOSIS — H10.30 UNSPECIFIED ACUTE CONJUNCTIVITIS, UNSPECIFIED EYE: ICD-10-CM

## 2022-05-14 PROCEDURE — 99213 OFFICE O/P EST LOW 20 MIN: CPT | Mod: 95

## 2022-05-14 RX ORDER — OFLOXACIN 3 MG/ML
0.3 SOLUTION/ DROPS OPHTHALMIC 4 TIMES DAILY
Qty: 1 | Refills: 0 | Status: ACTIVE | COMMUNITY
Start: 2022-05-14 | End: 1900-01-01

## 2022-05-14 NOTE — PLAN
[With new medications prescribed] : Treat in place: with new medications prescribed [FreeTextEntry1] : 32yoF p/w right eye purulent discharge, awoke with eye shut.  +sick contacts with conjunctivitis. will tx. \par -Ofloxacin gtt\par -Precautions given to go to ED if any : headache, fever, vomiting, pain with EOMI, blurry vision, or any other acute causes of concern.

## 2022-05-14 NOTE — REVIEW OF SYSTEMS
[Discharge] : discharge [Redness] : redness [Itching] : itching [Fever] : no fever [Pain] : no pain [Vision Problems] : no vision problems [Nausea] : no nausea

## 2022-05-14 NOTE — PHYSICAL EXAM
[No Acute Distress] : no acute distress [EOMI] : extraocular movements intact [No Respiratory Distress] : no respiratory distress  [de-identified] : HC/AT, EOMI, minimal conjunctival injection of right eye, no shirley-orbital edema or erythema

## 2022-05-14 NOTE — ASSESSMENT
[FreeTextEntry1] : 32yoF p/w right eye purulent discharge, awoke with eye shut.  +sick contacts with conjunctivitis. will tx. \par -Ofloxacin gtt\par -Precautions given to go to ED if any : headache, fever, vomiting, pain with EOMI, blurry vision, or any other acute causes of concern.

## 2022-05-14 NOTE — HISTORY OF PRESENT ILLNESS
[Home] : at home, [unfilled] , at the time of the visit. [Other Location: e.g. Home (Enter Location, City,State)___] : at [unfilled] [Verbal consent obtained from patient] : the patient, [unfilled] [FreeTextEntry8] : 32yoF; with no signif PMH; now p/w right eye purulent discharge, red itchy eyes, and swelling over right eyelid.  states she awoke with her eye seal shut with purulent discharge this morning.  states she has been having cough, congestion over past 48 hours but tested negative for covid. states her daughter had otitis media and also had conjunctivitis for which she was receiving abx.  denies headache. denies pain with eye movement. denies fever. denies vomiting. denies blurry vision.\par PMH: denies\par ALL: PCN\par

## 2022-07-22 ENCOUNTER — APPOINTMENT (OUTPATIENT)
Dept: FAMILY MEDICINE | Facility: CLINIC | Age: 32
End: 2022-07-22

## 2022-07-22 DIAGNOSIS — F41.9 ANXIETY DISORDER, UNSPECIFIED: ICD-10-CM

## 2022-07-22 DIAGNOSIS — F32.A DEPRESSION, UNSPECIFIED: ICD-10-CM

## 2022-07-22 DIAGNOSIS — E03.9 HYPOTHYROIDISM, UNSPECIFIED: ICD-10-CM

## 2022-07-22 PROCEDURE — 99214 OFFICE O/P EST MOD 30 MIN: CPT | Mod: 95

## 2022-07-22 RX ORDER — ALPRAZOLAM 0.5 MG/1
0.5 TABLET ORAL
Qty: 30 | Refills: 0 | Status: ACTIVE | COMMUNITY
Start: 2020-05-18 | End: 1900-01-01

## 2022-07-22 RX ORDER — FLUOXETINE HYDROCHLORIDE 10 MG/1
10 CAPSULE ORAL
Qty: 90 | Refills: 1 | Status: ACTIVE | COMMUNITY
Start: 2022-03-11 | End: 1900-01-01

## 2022-07-22 NOTE — HISTORY OF PRESENT ILLNESS
[Home] : at home, [unfilled] , at the time of the visit. [Medical Office: ([facility])___] : at the medical office located in  [Patient] : the patient [Self] : self [FreeTextEntry8] : 32 year old female is here for a followup visit for post pardum depression.  There has been no new medications since the last visit. Patient is feeling well with no active changes or issues since Her last visit\par baby born 3/18/2020 - baby girl\par was suffering from severe anxiety, depression, insomnia, constant thoughts, ocd. - but now doing much better \par

## 2022-07-22 NOTE — ASSESSMENT
[FreeTextEntry1] : thyroid\par Patient had a diagnosis of thyroid disorder.  Patient will continue on current dose of medications at this time unless instructed otherwise. Patient was advised to take thyroid medications on a empty stomach and to wait at least 45 minutes before eating for appropriate absorption.\par followed by donavon, no longer on Synthroid, possibly due to pregnancy\par \par anxiety and insomnia\par Discussed diagnosis of anxiety with the patient and potential outcomes/side affects of treatment versus non treatment. Medications were assessed and described at length. Side affects and black box warning were discussed.  Patient was advised to not stop medications without discussing with a health care provider first. Patient was advised to continue psychotherapy or seek therapy if not currently attending. Patient was educated on addictive potential of controlled substances and was counseled to use only as needed and sparingly. Patient verbalized understanding of all the above.\par patient having panic and anxiety due to new baby at home, anxiety due to covid\par will give, black box warning\par tried ativan which has helped immensely, has been taking regularly at night and sometimes only as needed in the morning\par patient now finding ocd at night, cannot calm her brain, is extremely  nervous about lack of sleep, cannot sleep, depressed, sad, up and down emotion, \par 3/22/22  seeing psychiatrist - on prozac 30, feeling much better, seeing therapist, doing much better\par insomnia - is on seroquel - 25 - has been on for a 18 months, wishes to come off, discussed options, will trial stopping, if needs to take, she can. will continue on prozac 30\par wishes us to take over care of medications. will send today \par 7/22/22 feels prozac is a good fit, doing well, able to control emotions, still has emotions\par

## 2022-07-22 NOTE — HEALTH RISK ASSESSMENT
[Yes] : Yes [No falls in past year] : Patient reported no falls in the past year [0] : 2) Feeling down, depressed, or hopeless: Not at all (0) [[id number]] : 0

## 2022-07-22 NOTE — PHYSICAL EXAM
[Well Nourished] : well nourished [Well Developed] : well developed [Well-Appearing] : well-appearing [Memory Grossly Normal] : memory grossly normal [Normal Affect] : the affect was normal [Normal Insight/Judgement] : insight and judgment were intact

## 2022-07-22 NOTE — REVIEW OF SYSTEMS
[Insomnia] : insomnia [Anxiety] : anxiety [Depression] : depression [Negative] : Heme/Lymph [de-identified] : severe, anxious, ocd, thoughts, insomnia - improved

## 2023-01-05 RX ORDER — FLUOXETINE HYDROCHLORIDE 20 MG/1
20 CAPSULE ORAL
Qty: 30 | Refills: 0 | Status: ACTIVE | COMMUNITY
Start: 2022-03-11 | End: 1900-01-01

## 2023-01-22 ENCOUNTER — APPOINTMENT (OUTPATIENT)
Dept: FAMILY MEDICINE | Facility: CLINIC | Age: 33
End: 2023-01-22

## 2023-02-02 ENCOUNTER — APPOINTMENT (OUTPATIENT)
Dept: FAMILY MEDICINE | Facility: CLINIC | Age: 33
End: 2023-02-02

## 2023-12-19 ENCOUNTER — NON-APPOINTMENT (OUTPATIENT)
Age: 33
End: 2023-12-19

## 2024-02-03 NOTE — ASSESSMENT
[FreeTextEntry1] : thyroid\par Patient had a diagnosis of thyroid disorder.  Patient will continue on current dose of medications at this time unless instructed otherwise. Patient was advised to take thyroid medications on a empty stomach and to wait at least 45 minutes before eating for appropriate absorption.\par followed by donavon, no longer on Synthroid, possibly due to pregnancy\par \par anxiety and insomnia\par Discussed diagnosis of anxiety with the patient and potential outcomes/side affects of treatment versus non treatment. Medications were assessed and described at length. Side affects and black box warning were discussed.  Patient was advised to not stop medications without discussing with a health care provider first. Patient was advised to continue psychotherapy or seek therapy if not currently attending. Patient was educated on addictive potential of controlled substances and was counseled to use only as needed and sparingly. Patient verbalized understanding of all the above.\par patient having panic and anxiety due to new baby at home, anxiety due to covid\par will give, black box warning\par tried ativan which has helped immensely, has been taking regularly at night and sometimes only as needed in the morning\par patient now finding ocd at night, cannot calm her brain, is extremely  nervous about lack of sleep, cannot sleep, depressed, sad, up and down emotion, \par will start lexapro\par will switch to xanax for onset of action normal...

## 2024-07-05 NOTE — OB RN PATIENT PROFILE - AS [facility]
Called to triage per portal message, no answer. Left message to return call. Will also send portal message.   
Called to triage pt, no answer. Left message to return call.   
(4) no limitation

## 2024-10-11 ENCOUNTER — APPOINTMENT (OUTPATIENT)
Dept: PLASTIC SURGERY | Facility: CLINIC | Age: 34
End: 2024-10-11